# Patient Record
Sex: FEMALE | Race: OTHER | NOT HISPANIC OR LATINO | Employment: FULL TIME | URBAN - METROPOLITAN AREA
[De-identification: names, ages, dates, MRNs, and addresses within clinical notes are randomized per-mention and may not be internally consistent; named-entity substitution may affect disease eponyms.]

---

## 2020-05-21 ENCOUNTER — OFFICE VISIT (OUTPATIENT)
Dept: INTERNAL MEDICINE | Facility: CLINIC | Age: 35
End: 2020-05-21

## 2020-05-21 VITALS
SYSTOLIC BLOOD PRESSURE: 128 MMHG | RESPIRATION RATE: 16 BRPM | TEMPERATURE: 97.8 F | HEIGHT: 63 IN | OXYGEN SATURATION: 97 % | DIASTOLIC BLOOD PRESSURE: 84 MMHG | HEART RATE: 80 BPM | BODY MASS INDEX: 37.74 KG/M2 | WEIGHT: 213 LBS

## 2020-05-21 DIAGNOSIS — Z00.00 HEALTHCARE MAINTENANCE: ICD-10-CM

## 2020-05-21 DIAGNOSIS — Z13.1 SCREENING FOR DIABETES MELLITUS: ICD-10-CM

## 2020-05-21 DIAGNOSIS — Z13.220 SCREENING, LIPID: ICD-10-CM

## 2020-05-21 DIAGNOSIS — L68.0 HIRSUTISM: ICD-10-CM

## 2020-05-21 DIAGNOSIS — N92.6 MENSTRUAL IRREGULARITY: Primary | ICD-10-CM

## 2020-05-21 DIAGNOSIS — R63.5 WEIGHT GAIN: ICD-10-CM

## 2020-05-21 PROCEDURE — 99203 OFFICE O/P NEW LOW 30 MIN: CPT | Performed by: INTERNAL MEDICINE

## 2020-05-21 RX ORDER — PSEUDOEPHEDRINE HCL 30 MG
30 TABLET ORAL EVERY 4 HOURS PRN
COMMUNITY

## 2020-05-21 RX ORDER — GARLIC 200 MG
TABLET ORAL
COMMUNITY

## 2020-05-21 RX ORDER — MELATONIN 5 MG
20 TABLET,CHEWABLE ORAL
COMMUNITY

## 2020-05-21 RX ORDER — FLUTICASONE PROPIONATE 50 MCG
2 SPRAY, SUSPENSION (ML) NASAL DAILY
COMMUNITY

## 2020-05-21 RX ORDER — NAPROXEN SODIUM 220 MG
220 TABLET ORAL 2 TIMES DAILY PRN
COMMUNITY

## 2020-05-21 RX ORDER — IBUPROFEN 200 MG
200 TABLET ORAL EVERY 6 HOURS PRN
COMMUNITY

## 2020-05-21 NOTE — PROGRESS NOTES
Internal Medicine New Patient  Eileen Hewitt is a 34 y.o. male who presents today to establish care and with concerns as outlined below.    Chief Complaint  Chief Complaint   Patient presents with   • Establish Care        HPI  Ms. Hewitt comes in today to establish care and with concern for thyroid dysfunction. Last PCP in [facility]. She reports long[facility] and hair around her areola. She has previously gotten laser hair removal to try and treat it. She notes more recently irregular menstruation and has missed the last 2 cycles. She denies possibility of pregnancy. She also notes difficulty with weight loss despite diet, exercise, and having a . She notes weight gain since moving here from [facility] in 2018. She attributes some of this to her poor diet but is concerned for underlying issue. She had GYN in [facility] and reports pap smears were normal in [facility], no particular workup of her chronic hirsutism in [facility]. She changes tampon every 4 hours and after 4 days can use panty liner for remaining 3 days of cycle. She takes lemon juice daily for allergic rhinitis and several medications/supplments as needed including flonase for allergies, activated charcoal, vitamin C, ibuprofen/aleve and iron with menstruation, melatonin for sleep.       Review of Systems  Review of Systems   Constitutional: Positive for unexpected weight gain.   Respiratory: Negative.    Cardiovascular: Negative.    Gastrointestinal: Negative.    Endocrine: Negative.    Genitourinary: Negative for decreased urine volume, difficulty urinating, dysuria, flank pain, frequency, hematuria and urgency.        +menstrual irregularity, missed cycles   Musculoskeletal: Negative.    Skin: Negative for rash (no acne).        +hirsutism   Neurological: Negative.    Psychiatric/Behavioral: Negative.         Past Medical History  Past Medical History:   Diagnosis Date   • Allergic    • Headache          Surgical History  Past Surgical History:   Procedure Laterality Date   • SHOULDER SURGERY      Cyst removal   • WRIST GANGLION EXCISION Bilateral     2011 and 2004        Family History  Family History   Problem Relation Age of Onset   • Hypertension Mother    • Diabetes Mother    • Hypertension Father    • Hyperlipidemia Maternal Aunt    • Stroke Maternal Aunt    • Cancer Maternal Aunt    • Diabetes Maternal Aunt    • Gout Maternal Aunt    • Hyperlipidemia Maternal Uncle    • Diabetes Maternal Uncle    • Cancer Maternal Uncle    • Hyperlipidemia Paternal Aunt    • Cancer Paternal Aunt    • Hyperlipidemia Paternal Uncle    • Heart disease Maternal Grandmother    • Diabetes Maternal Grandmother    • Stroke Paternal Grandmother         Social History  Social History     Socioeconomic History   • Marital status: Single     Spouse name: Not on file   • Number of children: Not on file   • Years of education: Not on file   • Highest education level: Not on file   Tobacco Use   • Smoking status: Never Smoker   Substance and Sexual Activity   • Alcohol use: Never     Frequency: Never   • Drug use: Never   • Sexual activity: Yes     Partners: Male        Current Medications  Current Outpatient Medications on File Prior to Visit   Medication Sig Dispense Refill   • Ascorbic Acid (VITAMIN C) powder Take  by mouth.     • Charcoal Activated (CHARCOAL PO) Take  by mouth.     • Ferrous Sulfate (IRON) 28 MG tablet Take  by mouth.     • fluticasone (FLONASE) 50 MCG/ACT nasal spray 2 sprays into the nostril(s) as directed by provider Daily.     • ibuprofen (ADVIL,MOTRIN) 200 MG tablet Take 200 mg by mouth Every 6 (Six) Hours As Needed for Mild Pain .     • Melatonin 5 MG chewable tablet Chew 20 mg.     • naproxen sodium (ALEVE) 220 MG tablet Take 220 mg by mouth 2 (Two) Times a Day As Needed.     • pseudoephedrine (SUDAFED) 30 MG tablet Take 30 mg by mouth Every 4 (Four) Hours As Needed for Congestion.       No current  "facility-administered medications on file prior to visit.        Allergies  No Known Allergies     Objective  Visit Vitals  /84   Pulse 80   Temp 97.8 °F (36.6 °C)   Resp 16   Ht 160 cm (63\")   Wt 96.6 kg (213 lb)   SpO2 97%   BMI 37.73 kg/m²        Physical Exam  Physical Exam   Constitutional: He is oriented to person, place, and time. He appears well-developed and well-nourished. No distress. He appears overweight.   HENT:   Head: Normocephalic and atraumatic.   Right Ear: External ear normal.   Left Ear: External ear normal.   Nose: Nose normal.   Mouth/Throat: Oropharynx is clear and moist.   Eyes: Pupils are equal, round, and reactive to light. Conjunctivae and EOM are normal. No scleral icterus.   Neck: Neck supple.   Cardiovascular: Normal rate, regular rhythm and normal heart sounds.   No murmur heard.  Pulmonary/Chest: Effort normal and breath sounds normal. No respiratory distress.   Abdominal: Soft. Bowel sounds are normal. He exhibits no distension. There is no tenderness.   Musculoskeletal: He exhibits no edema or deformity.   Lymphadenopathy:     He has no cervical adenopathy.   Neurological: He is alert and oriented to person, place, and time.   Skin: Skin is warm and dry. No rash noted. He is not diaphoretic.   Hirsutism on chin, scarring from prior laser hair removal   Psychiatric: He has a normal mood and affect. His behavior is normal. Judgment and thought content normal.   Nursing note and vitals reviewed.       Results  No results found for this or any previous visit.     Assessment and Plan  Eileen was seen today for establish care.    Diagnoses and all orders for this visit:    Menstrual irregularity, weight gain, hirsutism  - Given constitution of symptoms could have PCOS, hyperandrogenism, however also possible to have thyroid dysfunction, pregnancy, prolactinoma  - Will obtain FSH, LH, DHEA-S, 8AM 17-OHP, TSH, prolactin, HCG    Screening, lipid  - Given family history and obesity, " screening lipid ordered    Screening for diabetes mellitus  - Given family history and obesity, screening lipid ordered    Healthcare maintenance  - CBC and CMP ordered    Health Maintenance   Topic Date Due   • ANNUAL PHYSICAL  12/30/1987   • TDAP/TD VACCINES (1 - Tdap) 12/30/1995   • HEPATITIS C SCREENING  05/21/2020   • INFLUENZA VACCINE  08/01/2020     Health Maintenance  - Pap smear: Reports prior normal pap smear in [facility], records requested.  - Mammogram: Start screening at age 40.  - Colonoscopy: Start screening at age 50.  - HCV: ordered  - Immunizations: Records requested.  - Depression screening: screen at next visit.    Return in about 3 months (around 8/21/2020) for Follow up, Labs tomorrow. 1 year for annual..

## 2020-05-22 ENCOUNTER — LAB (OUTPATIENT)
Dept: LAB | Facility: HOSPITAL | Age: 35
End: 2020-05-22

## 2020-05-22 DIAGNOSIS — Z13.220 SCREENING, LIPID: ICD-10-CM

## 2020-05-22 DIAGNOSIS — Z00.00 HEALTHCARE MAINTENANCE: ICD-10-CM

## 2020-05-22 DIAGNOSIS — Z13.1 SCREENING FOR DIABETES MELLITUS: ICD-10-CM

## 2020-05-22 DIAGNOSIS — R63.5 WEIGHT GAIN: ICD-10-CM

## 2020-05-22 DIAGNOSIS — N92.6 MENSTRUAL IRREGULARITY: ICD-10-CM

## 2020-05-22 DIAGNOSIS — L68.0 HIRSUTISM: ICD-10-CM

## 2020-05-22 LAB
ALBUMIN SERPL-MCNC: 4.4 G/DL (ref 3.5–5.2)
ALBUMIN/GLOB SERPL: 1.4 G/DL
ALP SERPL-CCNC: 74 U/L (ref 39–117)
ALT SERPL W P-5'-P-CCNC: 45 U/L (ref 1–33)
ANION GAP SERPL CALCULATED.3IONS-SCNC: 12.3 MMOL/L (ref 5–15)
AST SERPL-CCNC: 27 U/L (ref 1–32)
BILIRUB SERPL-MCNC: 0.4 MG/DL (ref 0.2–1.2)
BUN BLD-MCNC: 25 MG/DL (ref 6–20)
BUN/CREAT SERPL: 31.3 (ref 7–25)
CALCIUM SPEC-SCNC: 9.7 MG/DL (ref 8.6–10.5)
CHLORIDE SERPL-SCNC: 103 MMOL/L (ref 98–107)
CHOLEST SERPL-MCNC: 190 MG/DL (ref 0–200)
CO2 SERPL-SCNC: 23.7 MMOL/L (ref 22–29)
CREAT BLD-MCNC: 0.8 MG/DL (ref 0.57–1)
DEPRECATED RDW RBC AUTO: 43 FL (ref 37–54)
ERYTHROCYTE [DISTWIDTH] IN BLOOD BY AUTOMATED COUNT: 13.2 % (ref 12.3–15.4)
FSH SERPL-ACNC: 1.14 MIU/ML
GFR SERPL CREATININE-BSD FRML MDRD: 82 ML/MIN/1.73
GFR SERPL CREATININE-BSD FRML MDRD: 99 ML/MIN/1.73
GLOBULIN UR ELPH-MCNC: 3.2 GM/DL
GLUCOSE BLD-MCNC: 78 MG/DL (ref 65–99)
HBA1C MFR BLD: 5.64 % (ref 4.8–5.6)
HCG SERPL QL: NEGATIVE
HCT VFR BLD AUTO: 43.9 % (ref 34–46.6)
HDLC SERPL-MCNC: 36 MG/DL (ref 40–60)
HGB BLD-MCNC: 14.8 G/DL (ref 12–15.9)
LDLC SERPL CALC-MCNC: 137 MG/DL (ref 0–100)
LDLC/HDLC SERPL: 3.82 {RATIO}
LH SERPL-ACNC: 2.2 MIU/ML
MCH RBC QN AUTO: 30 PG (ref 26.6–33)
MCHC RBC AUTO-ENTMCNC: 33.7 G/DL (ref 31.5–35.7)
MCV RBC AUTO: 89 FL (ref 79–97)
PLATELET # BLD AUTO: 302 10*3/MM3 (ref 140–450)
PMV BLD AUTO: 10.6 FL (ref 6–12)
POTASSIUM BLD-SCNC: 4.8 MMOL/L (ref 3.5–5.2)
PROLACTIN SERPL-MCNC: 30.6 NG/ML (ref 4.79–23.3)
PROT SERPL-MCNC: 7.6 G/DL (ref 6–8.5)
RBC # BLD AUTO: 4.93 10*6/MM3 (ref 3.77–5.28)
SODIUM BLD-SCNC: 139 MMOL/L (ref 136–145)
TESTOST SERPL-MCNC: 16.1 NG/DL (ref 8.4–48.1)
TRIGL SERPL-MCNC: 83 MG/DL (ref 0–150)
TSH SERPL DL<=0.05 MIU/L-ACNC: 2.42 UIU/ML (ref 0.27–4.2)
VLDLC SERPL-MCNC: 16.6 MG/DL (ref 5–40)
WBC NRBC COR # BLD: 7.4 10*3/MM3 (ref 3.4–10.8)

## 2020-05-22 PROCEDURE — 82627 DEHYDROEPIANDROSTERONE: CPT | Performed by: INTERNAL MEDICINE

## 2020-05-22 PROCEDURE — 84403 ASSAY OF TOTAL TESTOSTERONE: CPT | Performed by: INTERNAL MEDICINE

## 2020-05-22 PROCEDURE — 85027 COMPLETE CBC AUTOMATED: CPT | Performed by: INTERNAL MEDICINE

## 2020-05-22 PROCEDURE — 83036 HEMOGLOBIN GLYCOSYLATED A1C: CPT | Performed by: INTERNAL MEDICINE

## 2020-05-22 PROCEDURE — 84443 ASSAY THYROID STIM HORMONE: CPT | Performed by: INTERNAL MEDICINE

## 2020-05-22 PROCEDURE — 84703 CHORIONIC GONADOTROPIN ASSAY: CPT | Performed by: INTERNAL MEDICINE

## 2020-05-22 PROCEDURE — 80053 COMPREHEN METABOLIC PANEL: CPT | Performed by: INTERNAL MEDICINE

## 2020-05-22 PROCEDURE — 83001 ASSAY OF GONADOTROPIN (FSH): CPT | Performed by: INTERNAL MEDICINE

## 2020-05-22 PROCEDURE — 83498 ASY HYDROXYPROGESTERONE 17-D: CPT | Performed by: INTERNAL MEDICINE

## 2020-05-22 PROCEDURE — 84146 ASSAY OF PROLACTIN: CPT | Performed by: INTERNAL MEDICINE

## 2020-05-22 PROCEDURE — 80061 LIPID PANEL: CPT | Performed by: INTERNAL MEDICINE

## 2020-05-22 PROCEDURE — 83002 ASSAY OF GONADOTROPIN (LH): CPT | Performed by: INTERNAL MEDICINE

## 2020-05-23 LAB — DHEA-S SERPL-MCNC: 219 UG/DL (ref 57.3–279.2)

## 2020-05-27 LAB — 17OHP SERPL-MCNC: 72 NG/DL

## 2020-05-28 DIAGNOSIS — L68.0 HIRSUTISM: ICD-10-CM

## 2020-05-28 DIAGNOSIS — E28.2 PCOS (POLYCYSTIC OVARIAN SYNDROME): Primary | ICD-10-CM

## 2020-05-28 DIAGNOSIS — N92.6 MENSTRUAL IRREGULARITY: ICD-10-CM

## 2020-05-29 ENCOUNTER — TELEPHONE (OUTPATIENT)
Dept: INTERNAL MEDICINE | Facility: CLINIC | Age: 35
End: 2020-05-29

## 2020-05-29 NOTE — TELEPHONE ENCOUNTER
----- Message from Ashley Manriquez MD sent at 5/28/2020  4:53 PM EDT -----  Please call Ms. Hewitt and let her know that based on the workup done recently I suspect PCOS or polycystic ovarian syndrome is most likely the cause of her hair growth, difficulty with weight loss, and recent irregular menstruation. This is not yet certain however, as her labs also show an elevated prolactin which can at least cause irregular menstruation when high enough. Her level was only mildly elevated and will need repeated in several months. Due to possible diagnosis of PCOS I would like to refer her to GYN for evaluation and their opinion, discussion of treatment. Her thyroid function was normal and her diabetes screen was negative. Her cholesterol is elevated. This may be related to possible PCOS as well as it often is associated with metabolic issues like hyperlipidemia. This is best managed as she has been attempting to do with diet and exercise.

## 2020-06-12 ENCOUNTER — OFFICE VISIT (OUTPATIENT)
Dept: OBSTETRICS AND GYNECOLOGY | Facility: CLINIC | Age: 35
End: 2020-06-12

## 2020-06-12 ENCOUNTER — LAB (OUTPATIENT)
Dept: LAB | Facility: HOSPITAL | Age: 35
End: 2020-06-12

## 2020-06-12 VITALS
DIASTOLIC BLOOD PRESSURE: 72 MMHG | RESPIRATION RATE: 14 BRPM | HEIGHT: 63 IN | BODY MASS INDEX: 38.27 KG/M2 | WEIGHT: 216 LBS | SYSTOLIC BLOOD PRESSURE: 106 MMHG

## 2020-06-12 DIAGNOSIS — Z01.419 WELL WOMAN EXAM WITH ROUTINE GYNECOLOGICAL EXAM: ICD-10-CM

## 2020-06-12 DIAGNOSIS — L68.0 FEMALE HIRSUTISM: ICD-10-CM

## 2020-06-12 DIAGNOSIS — L68.0 FEMALE HIRSUTISM: Primary | ICD-10-CM

## 2020-06-12 DIAGNOSIS — N92.6 IRREGULAR PERIODS/MENSTRUAL CYCLES: ICD-10-CM

## 2020-06-12 PROCEDURE — 99203 OFFICE O/P NEW LOW 30 MIN: CPT | Performed by: OBSTETRICS & GYNECOLOGY

## 2020-06-12 PROCEDURE — 36415 COLL VENOUS BLD VENIPUNCTURE: CPT

## 2020-06-12 PROCEDURE — 83525 ASSAY OF INSULIN: CPT

## 2020-06-12 NOTE — PROGRESS NOTES
Subjective   Eileen Hewitt is a 34 y.o. female is being seen for consultation today at the request of Ashley Manriquez MD    Chief Complaint   Patient presents with   • Establish Care     Possible PCOS        History of Present Illness  Patient presents with a history of weight gain hirsutism, and irregular periods.  Patient moved here from [facility].  Over the last year or 2 she has gained weight and her cycles have become irregular.  Patient has had problems with hirsutism since she was 17.  She has been referred by Dr. Ashley Martel with a diagnosis of probable polycystic ovarian syndrome.  Patient was just  this week.  She is wanting to get pregnant in the near future.  The following portions of the patient's history were reviewed and updated as appropriate: allergies, current medications, past family history, past medical history, past social history, past surgical history and problem list.    Review of Systems - History obtained from the patient  General ROS: positive for  - weight gain  Psychological ROS: negative  ENT ROS: negative  Allergy and Immunology ROS: positive for - seasonal allergies  Hematological and Lymphatic ROS: negative  Endocrine ROS: negative  Breast ROS: negative for breast lumps  Respiratory ROS: no cough, shortness of breath, or wheezing  Cardiovascular ROS: no chest pain or dyspnea on exertion  Gastrointestinal ROS: no abdominal pain, change in bowel habits, or black or bloody stools  Genito-Urinary ROS: no dysuria, trouble voiding, or hematuria  Musculoskeletal ROS: negative  Neurological ROS: no TIA or stroke symptoms  Dermatological ROS: negative     Objective   General:  well developed; well nourished  no acute distress   Skin:  No suspicious lesions seen   Thyroid: not examined   Breasts:  Examined in supine position  Symmetric without masses or skin dimpling  Nipples normal without inversion, lesions or discharge  There are no palpable axillary nodes   Abdomen: soft,  non-tender; no masses  no umbilical or inguinal hernias are present  no hepato-splenomegaly   Heart: regular rate and rhythm, S1, S2 normal, no murmur, click, rub or gallop   Lungs: clear to auscultation   Pelvis: Clinical staff was present for exam  External genitalia:  normal appearance of the external genitalia including Bartholin's and [place]'s glands.  :  urethral meatus normal;  Vaginal:  normal pink mucosa without prolapse or lesions.  Cervix:  normal appearance. Pap smear was done  Uterus:  normal size, shape and consistency.  Adnexa:  normal bimanual exam of the adnexa.  Rectal:  digital rectal exam not performed; anus visually normal appearing.         Assessment/Plan   Eileen was seen today for establish care.    Diagnoses and all orders for this visit:    Female hirsutism  -     Insulin, Random; Future    Irregular periods/menstrual cycles    Well woman exam with routine gynecological exam  -     Pap IG, Rfx HPV ASCU; Future      Random insulin level today  Transvaginal ultrasound in 2 weeks  Discussion the results of lab work and ultrasound  Patient will decide when she wants to become pregnant    Tashi Maharaj MD

## 2020-06-17 LAB — INSULIN SERPL-ACNC: 14 UIU/ML

## 2020-06-26 ENCOUNTER — OFFICE VISIT (OUTPATIENT)
Dept: OBSTETRICS AND GYNECOLOGY | Facility: CLINIC | Age: 35
End: 2020-06-26

## 2020-06-26 VITALS
WEIGHT: 216.8 LBS | SYSTOLIC BLOOD PRESSURE: 130 MMHG | BODY MASS INDEX: 38.41 KG/M2 | DIASTOLIC BLOOD PRESSURE: 76 MMHG | RESPIRATION RATE: 14 BRPM | HEIGHT: 63 IN

## 2020-06-26 DIAGNOSIS — N94.10 FEMALE DYSPAREUNIA: ICD-10-CM

## 2020-06-26 DIAGNOSIS — L68.0 FEMALE HIRSUTISM: Primary | ICD-10-CM

## 2020-06-26 PROCEDURE — 99212 OFFICE O/P EST SF 10 MIN: CPT | Performed by: OBSTETRICS & GYNECOLOGY

## 2020-06-26 RX ORDER — NORETHINDRONE ACETATE AND ETHINYL ESTRADIOL 1MG-20(21)
1 KIT ORAL DAILY
Qty: 28 TABLET | Refills: 12 | Status: SHIPPED | OUTPATIENT
Start: 2020-06-26 | End: 2021-04-22

## 2020-06-26 NOTE — PROGRESS NOTES
Subjective   Eileen Hewitt is a 34 y.o. female is here today for follow-up.    Chief Complaint   Patient presents with   • Follow-up     Discuss ultrasound         History of Present Illness  Patient returns today for a transvaginal ultrasound and review of the lab work and her evaluation of female hirsutism.  Patient was thought to have polycystic ovarian syndrome.  Her hemoglobin A1c is slightly elevated.  The ultrasound shows a normal anteverted uterus and ovaries.  Questionable rosary bead appearance is noted.  We will start the patient on low-dose birth control pills and have her return in 6 weeks  The following portions of the patient's history were reviewed and updated as appropriate: allergies, current medications, past family history, past medical history, past social history, past surgical history and problem list.    Review of Systems - Negative except for present illness    Objective   General:  well developed; well nourished  no acute distress   Skin:  Not performed.   Thyroid: not examined   Breasts:  Not performed.   Abdomen: Not performed.   Heart: regular rate and rhythm, S1, S2 normal, no murmur, click, rub or gallop   Lungs: clear to auscultation   Pelvis: Not performed.         Assessment/Plan   Eileen was seen today for follow-up.    Diagnoses and all orders for this visit:    Female hirsutism  -     norethindrone-ethinyl estradiol FE (Junel FE 1/20) 1-20 MG-MCG per tablet; Take 1 tablet by mouth Daily.    Female dyspareunia    Return in 6 weeks for evaluation of birth control treatment    Tashi Maharaj MD

## 2020-08-07 ENCOUNTER — OFFICE VISIT (OUTPATIENT)
Dept: OBSTETRICS AND GYNECOLOGY | Facility: CLINIC | Age: 35
End: 2020-08-07

## 2020-08-07 VITALS
HEIGHT: 63 IN | WEIGHT: 218.8 LBS | BODY MASS INDEX: 38.77 KG/M2 | DIASTOLIC BLOOD PRESSURE: 72 MMHG | RESPIRATION RATE: 14 BRPM | SYSTOLIC BLOOD PRESSURE: 114 MMHG

## 2020-08-07 DIAGNOSIS — L68.0 FEMALE HIRSUTISM: ICD-10-CM

## 2020-08-07 DIAGNOSIS — N94.10 FEMALE DYSPAREUNIA: Primary | ICD-10-CM

## 2020-08-07 PROCEDURE — 99212 OFFICE O/P EST SF 10 MIN: CPT | Performed by: OBSTETRICS & GYNECOLOGY

## 2020-08-07 NOTE — PROGRESS NOTES
Subjective   Eileen Hewitt is a 34 y.o. female is here today for follow-up.    Chief Complaint   Patient presents with   • Follow-up     Patient is here to follow-up on OCP's. Patient states she is still having pain with intercourse and irregular bleeding.         History of Present Illness  Patient has been on Loestrin 1/20 for about 6 weeks and returns to report how she is feeling.  She is better.  Her pain is slightly better.  The hirsutism is improving.  Patient is having irregular periods but has not finished her second cycle of pills yet.  She wants to continue the birth control pills.  She will return in 3 months for evaluation.  The following portions of the patient's history were reviewed and updated as appropriate: allergies, current medications, past family history, past medical history, past social history, past surgical history and problem list.    Review of Systems - Negative      Objective   General:  well developed; well nourished  no acute distress   Skin:  Not performed.   Thyroid: not examined   Breasts:  Not performed.   Abdomen: Not performed.   Heart: regular rate and rhythm, S1, S2 normal, no murmur, click, rub or gallop   Lungs: clear to auscultation   Pelvis: Not performed.         Assessment/Plan   Eileen was seen today for follow-up.    Diagnoses and all orders for this visit:    Female dyspareunia    Female hirsutism      Return in 3 months  Continue birth control pills  Call if periods become more irregular or complications develop    Tashi Mhaaraj MD

## 2020-11-13 ENCOUNTER — OFFICE VISIT (OUTPATIENT)
Dept: OBSTETRICS AND GYNECOLOGY | Facility: CLINIC | Age: 35
End: 2020-11-13

## 2020-11-13 VITALS
HEIGHT: 63 IN | WEIGHT: 225 LBS | DIASTOLIC BLOOD PRESSURE: 82 MMHG | RESPIRATION RATE: 14 BRPM | BODY MASS INDEX: 39.87 KG/M2 | SYSTOLIC BLOOD PRESSURE: 112 MMHG

## 2020-11-13 DIAGNOSIS — N94.3 PMS (PREMENSTRUAL SYNDROME): ICD-10-CM

## 2020-11-13 DIAGNOSIS — N94.10 FEMALE DYSPAREUNIA: Primary | ICD-10-CM

## 2020-11-13 PROCEDURE — 99212 OFFICE O/P EST SF 10 MIN: CPT | Performed by: OBSTETRICS & GYNECOLOGY

## 2020-11-13 NOTE — PROGRESS NOTES
Subjective   Eileen Hewitt is a 34 y.o. female is here today for follow-up.    Chief Complaint   Patient presents with   • Follow-up     No complaints        History of Present Illness  Patient returns today to follow-up on starting birth control pills to control mood swings, hirsutism, and dyspareunia.  As long as the patient takes her pills basically on time she feels great.  If she is a few hours later she starts having mood swings.  Her cycles are in good control and she is having no problems.  The following portions of the patient's history were reviewed and updated as appropriate: allergies, current medications, past family history, past medical history, past social history, past surgical history and problem list.    Review of Systems - Negative      Objective   General:  well developed; well nourished  no acute distress   Skin:  Not performed.   Thyroid: not examined   Breasts:  Not performed.   Abdomen: soft, non-tender; no masses  no umbilical or inguinal hernias are present  no hepato-splenomegaly   Heart: regular rate and rhythm, S1, S2 normal, no murmur, click, rub or gallop   Lungs: clear to auscultation   Pelvis: Not performed.         Assessment/Plan   Diagnoses and all orders for this visit:    1. Female dyspareunia (Primary)    2. PMS (premenstrual syndrome)    Continue birth control pills i.e. Loestrin 1/20 28    Tashi Maharaj MD

## 2021-03-26 ENCOUNTER — HOSPITAL ENCOUNTER (OUTPATIENT)
Age: 36
End: 2021-03-26
Payer: COMMERCIAL

## 2021-03-26 DIAGNOSIS — J34.3: Primary | ICD-10-CM

## 2021-03-26 DIAGNOSIS — R06.83: ICD-10-CM

## 2021-03-26 PROCEDURE — 70486 CT MAXILLOFACIAL W/O DYE: CPT

## 2021-04-08 ENCOUNTER — HOSPITAL ENCOUNTER (OUTPATIENT)
Age: 36
End: 2021-04-08
Payer: COMMERCIAL

## 2021-04-08 DIAGNOSIS — G47.30: Primary | ICD-10-CM

## 2021-04-08 DIAGNOSIS — J34.3: ICD-10-CM

## 2021-04-08 DIAGNOSIS — R06.83: ICD-10-CM

## 2021-04-08 PROCEDURE — 95806 SLEEP STUDY UNATT&RESP EFFT: CPT

## 2021-04-22 DIAGNOSIS — L68.0 FEMALE HIRSUTISM: ICD-10-CM

## 2021-04-22 RX ORDER — NORETHINDRONE ACETATE AND ETHINYL ESTRADIOL AND FERROUS FUMARATE 1MG-20(21)
KIT ORAL
Qty: 84 TABLET | Refills: 0 | Status: SHIPPED | OUTPATIENT
Start: 2021-04-22 | End: 2021-07-13

## 2021-04-22 NOTE — TELEPHONE ENCOUNTER
Dr. Maharaj's patient is requesting a refill on her birth control. Patient has an appointment on 6/14/2021. Will send in refills until her appointment

## 2021-06-15 ENCOUNTER — OFFICE VISIT (OUTPATIENT)
Dept: OBSTETRICS AND GYNECOLOGY | Facility: CLINIC | Age: 36
End: 2021-06-15

## 2021-06-15 VITALS
WEIGHT: 228.2 LBS | DIASTOLIC BLOOD PRESSURE: 72 MMHG | HEIGHT: 63 IN | BODY MASS INDEX: 40.43 KG/M2 | RESPIRATION RATE: 14 BRPM | SYSTOLIC BLOOD PRESSURE: 120 MMHG

## 2021-06-15 DIAGNOSIS — L68.0 FEMALE HIRSUTISM: ICD-10-CM

## 2021-06-15 DIAGNOSIS — Z30.41 VISIT FOR BIRTH CONTROL PILLS MAINTENANCE: ICD-10-CM

## 2021-06-15 DIAGNOSIS — N91.2 AMENORRHEA: Primary | ICD-10-CM

## 2021-06-15 PROCEDURE — 99213 OFFICE O/P EST LOW 20 MIN: CPT | Performed by: OBSTETRICS & GYNECOLOGY

## 2021-06-15 RX ORDER — MELATONIN
1000 DAILY
COMMUNITY

## 2021-06-15 RX ORDER — MONTELUKAST SODIUM 10 MG/1
10 TABLET ORAL NIGHTLY
COMMUNITY

## 2021-06-15 RX ORDER — BUPROPION HYDROCHLORIDE 300 MG/1
300 TABLET ORAL DAILY
COMMUNITY

## 2021-06-15 NOTE — PROGRESS NOTES
Subjective   Eileen Hewitt is a 35 y.o. female is here today as a self referral.    Chief Complaint   Patient presents with   • Menstrual Problem     Has not had a period since January 2021.        History of Present Illness  Patient started on birth control pills last summer.  She did this to control the symptoms of polyovarian syndrome.  She had normal cycles until November 2020.  She missed December, she had a period in January, and she has had no bleeding since then.  Patient wants to discontinue the birth control pills and see what her cycles do without them.  She was encouraged to do this if she decides.  Patient is treating major depression and is on multiple medications.  So the patient will discontinue oral contraceptives and call in 3 months if she is having unwanted symptoms.  The following portions of the patient's history were reviewed and updated as appropriate: allergies, current medications, past family history, past medical history, past social history, past surgical history and problem list.    Review of Systems - History obtained from the patient  General ROS: positive for  - weight gain  Psychological ROS: positive for - depression  ENT ROS: negative  Allergy and Immunology ROS: positive for - seasonal allergies  Hematological and Lymphatic ROS: negative  Endocrine ROS: negative  Breast ROS: negative for breast lumps  Respiratory ROS: no cough, shortness of breath, or wheezing  Cardiovascular ROS: no chest pain or dyspnea on exertion  Gastrointestinal ROS: no abdominal pain, change in bowel habits, or black or bloody stools  Genito-Urinary ROS: no dysuria, trouble voiding, or hematuria  Musculoskeletal ROS: negative  Neurological ROS: no TIA or stroke symptoms  Dermatological ROS: negative     Objective   General:  well developed; well nourished  no acute distress   Skin:  Not performed.   Thyroid: not examined   Breasts:  Not performed.   Abdomen: soft, non-tender; no masses  no umbilical or  inguinal hernias are present  no hepato-splenomegaly   Heart: regular rate and rhythm, S1, S2 normal, no murmur, click, rub or gallop   Lungs: clear to auscultation   Pelvis: Clinical staff was present for exam  External genitalia:  normal appearance of the external genitalia including Bartholin's and [place]'s glands.  :  urethral meatus normal;  Vaginal:  normal pink mucosa without prolapse or lesions.  Cervix:  normal appearance.  Uterus:  not palpable.  Adnexa:  non palpable bilaterally.  Rectal:  digital rectal exam not performed; anus visually normal appearing.         Assessment/Plan   Diagnoses and all orders for this visit:    1. Amenorrhea (Primary)    2. Visit for birth control pills maintenance    3. Female hirsutism      Return in 3 months to evaluate treatment    Tashi Maharaj MD

## 2021-07-13 DIAGNOSIS — L68.0 FEMALE HIRSUTISM: ICD-10-CM

## 2021-07-13 RX ORDER — NORETHINDRONE ACETATE AND ETHINYL ESTRADIOL AND FERROUS FUMARATE 1MG-20(21)
KIT ORAL
Qty: 84 TABLET | Refills: 0 | Status: SHIPPED | OUTPATIENT
Start: 2021-07-13

## 2021-07-13 NOTE — TELEPHONE ENCOUNTER
Dr. Maharaj's patient is requesting a refill on her birth control. Patient has an appointment on 9/15/2021. Will send in refills until her appointment.

## 2021-11-18 ENCOUNTER — HOSPITAL ENCOUNTER (OUTPATIENT)
Age: 36
End: 2021-11-18
Payer: COMMERCIAL

## 2021-11-18 DIAGNOSIS — G47.30: Primary | ICD-10-CM

## 2021-11-18 DIAGNOSIS — R06.83: ICD-10-CM

## 2021-11-18 DIAGNOSIS — R51.9: ICD-10-CM

## 2021-11-18 DIAGNOSIS — R53.83: ICD-10-CM

## 2021-11-18 DIAGNOSIS — R40.0: ICD-10-CM

## 2021-11-18 PROCEDURE — 95810 POLYSOM 6/> YRS 4/> PARAM: CPT

## 2023-04-24 ENCOUNTER — OFFICE VISIT (OUTPATIENT)
Dept: CARDIOLOGY | Facility: CLINIC | Age: 38
End: 2023-04-24
Payer: COMMERCIAL

## 2023-04-24 VITALS
WEIGHT: 242 LBS | DIASTOLIC BLOOD PRESSURE: 76 MMHG | OXYGEN SATURATION: 98 % | SYSTOLIC BLOOD PRESSURE: 104 MMHG | BODY MASS INDEX: 42.88 KG/M2 | HEART RATE: 108 BPM | HEIGHT: 63 IN

## 2023-04-24 DIAGNOSIS — G47.33 OSA (OBSTRUCTIVE SLEEP APNEA): ICD-10-CM

## 2023-04-24 DIAGNOSIS — I10 HYPERTENSION, UNSPECIFIED TYPE: Primary | ICD-10-CM

## 2023-04-24 RX ORDER — BUPROPION HYDROCHLORIDE 150 MG/1
TABLET ORAL
COMMUNITY
Start: 2023-01-23

## 2023-04-24 RX ORDER — ORAL SEMAGLUTIDE 3 MG/1
TABLET ORAL
COMMUNITY

## 2023-04-24 RX ORDER — HYDROCHLOROTHIAZIDE 12.5 MG/1
TABLET ORAL
COMMUNITY
Start: 2023-04-07

## 2023-04-24 NOTE — ASSESSMENT & PLAN NOTE
Diagnosed with obstructive sleep apnea and 2021, unknown baseline AHI at this time.  She has been using CPAP consistently but is still experiencing daytime sleepiness and fatigue.  She also wakes up with a headache occasionally. She would like the ramp setting turned off.   She will bring her CPAP chip in later this week. We will determine further evaluation and treatment after review of download.

## 2023-04-24 NOTE — PROGRESS NOTES
New Sleep Consult     Date:   2023  Name: Eileen Connors  :   1985  PCP: Arsh Kinney MD    Chief Complaint   Patient presents with   • Sleep Apnea   • Establish Care       Subjective     History of Present Illness  Eileen Connors is a 37 y.o. female with PMH of obstructive sleep apnea, HTN, anxiety, depression, polycystic ovarian syndrome, hidradenitis suppurativa who presents today for new patient evaluation.  Patient was diagnosed with obstructive sleep apnea in , unknown baseline AHI at this time.  She has been using CPAP consistently but is still experiencing daytime sleepiness and fatigue.  She occasionally wakes up with a headache and does not feel rested.  She is using a nasal mask doing okay with that, no significant leaking.  She does not like the ramp being on, it makes her feel like she is smothering initially.  She would like to turn the ramp off.  She was recently diagnosed with hypertension and started on hydrochlorothiazide.  She denies chest pain, shortness of breath, dizziness, palpitations, lower extremity edema or syncope.        [place] Scale is (out of 24):12    Estimated average amount of sleep per night: 7  Number of times she wakes up at night: 2  Difficulty falling back asleep: no  It usually takes 45 minutes to go to sleep.  She feels sleepy upon waking up: yes  Rotating or night shift work: no    Drowsiness/Sleepiness:  She exhibits the following:  excessive daytime sleepiness  excessive daytime fatigue  difficulty driving due to sleepiness    Snoring/Breathing:  She exhibits the following:  loud snoring      Weight:       23  1431   Weight: 110 kg (242 lb)        The patient's relevant past medical, surgical, family, and social history reviewed and updated in Epic as appropriate.    Past Medical History:   Diagnosis Date   • Allergic    • Headache    • Hirsutism    • Hypertension    • LAMINE (obstructive sleep apnea)      Past Surgical History:   Procedure  Laterality Date   • SHOULDER SURGERY      Cyst removal   • WRIST GANGLION EXCISION Bilateral      and      OB History        0    Para   0    Term   0       0    AB   0    Living   0       SAB   0    IAB   0    Ectopic   0    Molar   0    Multiple   0    Live Births   0              No Known Allergies  Prior to Admission medications    Medication Sig Start Date End Date Taking? Authorizing Provider   buPROPion XL (WELLBUTRIN XL) 150 MG 24 hr tablet  23  Yes Omar Cui MD   buPROPion XL (WELLBUTRIN XL) 300 MG 24 hr tablet Take 1 tablet by mouth Daily.   Yes Omar Cui MD   Semaglutide (Rybelsus) 3 MG tablet Take  by mouth.   Yes Omar Cui MD   hydroCHLOROthiazide (HYDRODIURIL) 12.5 MG tablet  23   Omar Cui MD   Ascorbic Acid (VITAMIN C) powder Take  by mouth.  23  Omar Cui MD   Charcoal Activated (CHARCOAL PO) Take  by mouth.  23  Omar Cui MD   cholecalciferol (VITAMIN D3) 25 MCG (1000 UT) tablet Take 1,000 Units by mouth Daily.  23  Omar Cui MD   Ferrous Sulfate (IRON) 28 MG tablet Take  by mouth.  23  Omar Cui MD   fluticasone (FLONASE) 50 MCG/ACT nasal spray 2 sprays into the nostril(s) as directed by provider Daily.  23  Omar Cui MD   ibuprofen (ADVIL,MOTRIN) 200 MG tablet Take 200 mg by mouth Every 6 (Six) Hours As Needed for Mild Pain .  23  Omar Cui MD   Junel FE  1-20 MG-MCG per tablet TAKE 1 TABLET BY MOUTH EVERY DAY 21  Tashi Maharaj MD   Melatonin 5 MG chewable tablet Chew 20 mg.  23  Omar Cui MD   montelukast (SINGULAIR) 10 MG tablet Take 10 mg by mouth Every Night.  23  Omar Cui MD   naproxen sodium (ALEVE) 220 MG tablet Take 220 mg by mouth 2 (Two) Times a Day As Needed.  23  Omar Cui MD   pseudoephedrine (SUDAFED) 30 MG tablet Take 30 mg by mouth  "Every 4 (Four) Hours As Needed for Congestion.  4/24/23  Provider, MD Omar     Family History   Problem Relation Age of Onset   • Hypertension Mother    • Other Mother         prediabetes   • Hypertension Father         not on medication   • Hyperlipidemia Maternal Aunt    • Stroke Maternal Aunt    • Diabetes Maternal Aunt    • Gout Maternal Aunt    • Hypertension Maternal Aunt    • Breast cancer Maternal Aunt         late 40s   • Kidney failure Maternal Aunt    • Hyperlipidemia Maternal Uncle    • Diabetes Maternal Uncle    • Pancreatic cancer Maternal Uncle    • Hyperlipidemia Paternal Aunt    • Breast cancer Paternal Aunt         early 50s   • Hyperlipidemia Paternal Uncle    • Heart disease Maternal Grandmother    • Diabetes Maternal Grandmother    • Lung disease Maternal Grandmother    • Stroke Paternal Grandmother    • ADD / ADHD Brother    • Glaucoma Maternal Grandfather    • Lung disease Paternal Grandfather         heavy smoker       Objective     Vital Signs:  /76   Pulse 108   Ht 160 cm (63\")   Wt 110 kg (242 lb)   SpO2 98%   BMI 42.87 kg/m²     Class 3 Severe Obesity (BMI >=40). Obesity-related health conditions include the following: obstructive sleep apnea and hypertension. Obesity is worsening. BMI is is above average; BMI management plan is completed. We discussed portion control, increasing exercise and pharmacologic options including recently started Rybelsus.         Physical Exam  Vitals reviewed.   Constitutional:       Appearance: Normal appearance.   HENT:      Head: Normocephalic.   Cardiovascular:      Rate and Rhythm: Normal rate and regular rhythm.      Heart sounds: Normal heart sounds.   Pulmonary:      Effort: Pulmonary effort is normal.      Breath sounds: Normal breath sounds.   Musculoskeletal:      Right lower leg: No edema.      Left lower leg: No edema.   Skin:     General: Skin is warm and dry.      Capillary Refill: Capillary refill takes less than 2 seconds. "   Neurological:      General: No focal deficit present.      Mental Status: She is alert and oriented to person, place, and time.   Psychiatric:         Mood and Affect: Mood normal.         Behavior: Behavior normal.             PAP download reviewed: .          Assessment and Plan     Eileen Connors is a 37 y.o. female who presents for further evaluation of excessive daytime sleepiness and fatigue.     Diagnoses and all orders for this visit:    1. Hypertension, unspecified type (Primary)  Assessment & Plan:  Hypertension is improving with treatment.  Continue current treatment regimen.  Dietary sodium restriction.  Weight loss.  Regular aerobic exercise.  Continue current medications.  Blood pressure will be reassessed at the next regular appointment.      2. LAMINE (obstructive sleep apnea)  Assessment & Plan:  Diagnosed with obstructive sleep apnea and 2021, unknown baseline AHI at this time.  She has been using CPAP consistently but is still experiencing daytime sleepiness and fatigue.  She also wakes up with a headache occasionally. She would like the ramp setting turned off.   She will bring her CPAP chip in later this week. We will determine further evaluation and treatment after review of download.         I discussed the consequences of uncontrolled sleep apnea including hypertension, heart disease, diabetes, stroke, and dementia. I further discussed sleep apnea therapeutic options including CPAP, Weight loss, Oral dental appliance, and surgery.    Increase pap therapy usage         Follow Up  Return in about 2 months (around 6/24/2023) for LAMINE.  Patient was given instructions and counseling regarding her condition or for health maintenance advice. Please see specific information pulled into the AVS if appropriate.

## 2023-06-22 PROBLEM — G47.19 EXCESSIVE DAYTIME SLEEPINESS: Status: ACTIVE | Noted: 2023-06-22

## 2023-07-06 ENCOUNTER — TELEPHONE (OUTPATIENT)
Dept: CARDIOLOGY | Facility: CLINIC | Age: 38
End: 2023-07-06

## 2023-07-06 NOTE — TELEPHONE ENCOUNTER
Caller: ALEK BAUMANN [facility]    Relationship: Provider    Best call back number: [phone]     What form or medical record are you requesting: LAST PN    Who is requesting this form or medical record from you: [facility]     How would you like to receive the form or medical records (pick-up, mail, fax): FAXED  If fax, what is the fax number: [phone]    Timeframe paperwork needed: ASAP, BEFORE 4:00PM    PT HAS A SLEEP STUDY TOMORROW EVENING 7/7/23